# Patient Record
Sex: FEMALE | Race: WHITE | Employment: STUDENT | URBAN - METROPOLITAN AREA
[De-identification: names, ages, dates, MRNs, and addresses within clinical notes are randomized per-mention and may not be internally consistent; named-entity substitution may affect disease eponyms.]

---
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
## 2021-02-16 NOTE — TELEPHONE ENCOUNTER
FUTURE VISIT INFORMATION      FUTURE VISIT INFORMATION:    Date: 3.4.21    Time: 9:40 Am    Location: [facility]  REFERRAL INFORMATION:    Referring provider:  N/A    Referring providers clinic:  N/A    Reason for visit/diagnosis: eye exam, no glasses or contacts    RECORDS REQUESTED FROM:       Clinic name Comments Records Status Imaging Status

## 2021-03-04 ENCOUNTER — OFFICE VISIT (OUTPATIENT)
Dept: OPHTHALMOLOGY | Facility: CLINIC | Age: 21
End: 2021-03-04
Payer: COMMERCIAL

## 2021-03-04 ENCOUNTER — PRE VISIT (OUTPATIENT)
Dept: OPHTHALMOLOGY | Facility: CLINIC | Age: 21
End: 2021-03-04

## 2021-03-04 DIAGNOSIS — H43.811 VITREOUS DEGENERATION OF RIGHT EYE: Primary | ICD-10-CM

## 2021-03-04 PROCEDURE — 92004 COMPRE OPH EXAM NEW PT 1/>: CPT | Performed by: OPHTHALMOLOGY

## 2021-03-04 RX ORDER — CITALOPRAM HYDROBROMIDE 20 MG/1
20 TABLET ORAL
COMMUNITY
Start: 2020-09-28 | End: 2021-09-28

## 2021-03-04 ASSESSMENT — TONOMETRY
OD_IOP_MMHG: 19
OS_IOP_MMHG: 20
IOP_METHOD: ICARE

## 2021-03-04 ASSESSMENT — SLIT LAMP EXAM - LIDS
COMMENTS: NORMAL
COMMENTS: NORMAL

## 2021-03-04 ASSESSMENT — CONF VISUAL FIELD
METHOD: COUNTING FINGERS
OD_NORMAL: 1
OS_NORMAL: 1

## 2021-03-04 ASSESSMENT — VISUAL ACUITY
OD_SC: 20/20
METHOD: SNELLEN - LINEAR
OS_SC: 20/20

## 2021-03-04 ASSESSMENT — CUP TO DISC RATIO
OD_RATIO: 0.1
OS_RATIO: 0.1

## 2021-03-04 ASSESSMENT — EXTERNAL EXAM - LEFT EYE: OS_EXAM: NORMAL

## 2021-03-04 ASSESSMENT — EXTERNAL EXAM - RIGHT EYE: OD_EXAM: NORMAL

## 2021-03-04 NOTE — NURSING NOTE
Chief Complaints and History of Present Illnesses   Patient presents with     Floaters Right Eye     Chief Complaint(s) and History of Present Illness(es)     Floaters Right Eye     Laterality: right eye    Quality: small    Duration: 2 months    Course: stable    Associated symptoms: Negative for flashes, shade, peripheral vision loss, blurred vision, headache and photophobia    Pain scale: 0/10              Comments     Pt notes that she noticed a floater in the RE since January, she notes this floater is small, but dark, not translucent like other floaters she has noticed in the past, moves with vision.     Maia Jackson COT March 4, 2021 9:41 AM

## 2021-03-04 NOTE — PROGRESS NOTES
HPI  Silvia Marquez is a 20 year old here for new floater RIGHT eye.  Has always noted small translucent ones but just noted a slightly darker floater in her RIGHT eye since January.  No photopsias or history of trauma.       PMH:   Past Medical History:   Diagnosis Date     NO ACTIVE PROBLEMS       POH: no glasses, no surgery, no trauma other than an elbow to one eye playing basketball in the past (unsure which eye)  Oc Meds: none  FH: Denies any retinal detachments, glaucoma, age related macular degeneration, or other known eye diseases . Her grandmother had some eye problems but not sure what they were.        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  Plan:  Natural history of vitreous floaters and retinal tear/detachment symptoms discussed with patient.   Follow-up exam in 1 month as needed to reassure patient, however I do not see anything that is of high concern today on exam.  Told to call for prompt dilated exam if new symptoms occur sooner.      -----------------------------------------------------------------------------------       Patient disposition:   Return in about 1 month (around 4/4/2021) for f/u floaters OD.. Patient to call sooner as needed.      Complete documentation of historical and exam elements from today's encounter can be found in the full encounter summary report (not reduplicated in this progress note). I personally obtained the chief complaint(s) and history of present illness.  I have confirmed and edited as necessary the CC, HPI, PMH/PSH, social history, FMH, ROS, and exam/neuro findings as obtained by the technician or others. I have examined this patient myself and I personally viewed the image(s) and studies listed above and the documentation reflects my findings and interpretation.  I formulated and edited as necessary the assessment  and plan and discussed the findings and management plan with the patient and family.     Geneva Austin MD

## 2021-08-19 ENCOUNTER — HOSPITAL ENCOUNTER (EMERGENCY)
Facility: HOSPITAL | Age: 21
Discharge: LEFT BEFORE DISCHARGE | End: 2021-08-19
Payer: COMMERCIAL

## 2023-04-06 NOTE — PROGRESS NOTES
SUBJECTIVE:                                                    Silvia Marquez is a 16 year old female who presents to clinic today for the following health issues:      Vaginal Bleeding (Dysmenorrhea)     Onset: Beginning of January     Description:   Duration of bleeding episodes: 6  Frequency between periods:  2  Describe bleeding/flow:   Clots: no   Number of pads/hour: 1  Cramping: mild    Accompanying Signs & Symptoms:  Weakness: no   Lightheadedness: no   Hot flashes: no   Nosebleeds/Easy bruising: Yes   Vaginal Discharge: no      History:  Patient's last menstrual period was 03/15/2017 (approximate). flow started today  Previous normal periods: YES  Contraceptive use: NO  Possibility of Pregnancy: no   Any bleeding after intercourse: no   Age of first period (menarche): 14  Abnormal PAP Smears: no     Precipitating factors:       Alleviating factors:       Therapies Tried and outcome:       Menarche age 14.  Had monthly since menarche.  Felt like flow was pretty light at that time.      Had normal period in early Jan and now flow has been about every 3 weeks since.  Flow is still pretty light.  Not a lot of cramping    Seems to be on an approximately 22-23 day cycle currently when counted out from her calendar    Pt questioned both with and without Mom in the room.  She denies any sexual activity and is not planning on becoming sexually active    Periods are not really a problem, more just frustrated that they are so frequent      Problem list and histories reviewed & adjusted, as indicated.  Additional history: as documented      Reviewed and updated as needed this visit by clinical staff  Tobacco  Allergies  Meds  Med Hx  Surg Hx  Fam Hx  Soc Hx      Reviewed and updated as needed this visit by Provider  Tobacco  Med Hx  Surg Hx  Fam Hx  Soc Hx        ROS: Remainder of Constitutional, CV, Respiratory, GI,  negative with exception of that mentioned above    PE:  VS as above   Gen:  WN/WD/WH  female in NAD  Remainder of exam deferred.    A/P:      ICD-10-CM    1. Irregular menstrual cycle N92.6      Reviewed with pt and mom normal course of menses in the few years after menarche.  Reviewed it can be normal for schedule to be irregular.  Reviewed ways to manage this including observation vs contraception.    As pt does not have heavy flow or a lot of pain observation would be very reasonable.  Reviewed risks and possible side effects of OCP's for cycle regulation.    She is not at all interested in contraception.  Happy with observation.  She will let us know if anything changes.  If she does decide she wishes OCP will be willing to prescribe when she calls.    15 minutes of 15 min appointment spent discussing menses as above       Dr. Lutz

## 2025-06-17 ENCOUNTER — APPOINTMENT (OUTPATIENT)
Dept: URBAN - METROPOLITAN AREA CLINIC 252 | Age: 25
Setting detail: DERMATOLOGY
End: 2025-06-17

## 2025-06-17 VITALS — HEIGHT: 59 IN | WEIGHT: 170 LBS

## 2025-06-17 DIAGNOSIS — L20.89 OTHER ATOPIC DERMATITIS: ICD-10-CM

## 2025-06-17 PROCEDURE — OTHER COUNSELING: OTHER

## 2025-06-17 PROCEDURE — OTHER PRESCRIPTION: OTHER

## 2025-06-17 RX ORDER — HYDROCORTISONE 25 MG/G
CREAM TOPICAL
Qty: 30 | Refills: 3 | Status: ERX | COMMUNITY
Start: 2025-06-17

## 2025-06-17 RX ORDER — CLOBETASOL PROPIONATE 0.5 MG/G
CREAM TOPICAL BID
Qty: 30 | Refills: 6 | Status: ERX | COMMUNITY
Start: 2025-06-17

## 2025-06-17 ASSESSMENT — LOCATION DETAILED DESCRIPTION DERM
LOCATION DETAILED: RIGHT RADIAL PALM
LOCATION DETAILED: RIGHT MEDIAL MALAR CHEEK
LOCATION DETAILED: LEFT THENAR EMINENCE

## 2025-06-17 ASSESSMENT — LOCATION SIMPLE DESCRIPTION DERM
LOCATION SIMPLE: RIGHT CHEEK
LOCATION SIMPLE: RIGHT HAND
LOCATION SIMPLE: LEFT HAND

## 2025-06-17 ASSESSMENT — LOCATION ZONE DERM
LOCATION ZONE: HAND
LOCATION ZONE: FACE